# Patient Record
Sex: MALE | Race: WHITE | NOT HISPANIC OR LATINO | URBAN - METROPOLITAN AREA
[De-identification: names, ages, dates, MRNs, and addresses within clinical notes are randomized per-mention and may not be internally consistent; named-entity substitution may affect disease eponyms.]

---

## 2017-05-01 ENCOUNTER — EMERGENCY (EMERGENCY)
Facility: HOSPITAL | Age: 57
LOS: 1 days | Discharge: HOME | End: 2017-05-01
Attending: EMERGENCY MEDICINE | Admitting: EMERGENCY MEDICINE
Payer: COMMERCIAL

## 2017-05-01 VITALS
DIASTOLIC BLOOD PRESSURE: 77 MMHG | TEMPERATURE: 98 F | HEART RATE: 60 BPM | SYSTOLIC BLOOD PRESSURE: 127 MMHG | RESPIRATION RATE: 16 BRPM | OXYGEN SATURATION: 97 %

## 2017-05-01 VITALS
HEIGHT: 69 IN | OXYGEN SATURATION: 95 % | DIASTOLIC BLOOD PRESSURE: 81 MMHG | SYSTOLIC BLOOD PRESSURE: 125 MMHG | TEMPERATURE: 98 F | WEIGHT: 214.07 LBS | HEART RATE: 61 BPM | RESPIRATION RATE: 18 BRPM
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
## 2017-05-01 DIAGNOSIS — S00.83XA CONTUSION OF OTHER PART OF HEAD, INITIAL ENCOUNTER: ICD-10-CM
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
## 2017-05-01 PROCEDURE — 70450 CT HEAD/BRAIN W/O DYE: CPT | Mod: 26

## 2017-05-01 PROCEDURE — 99284 EMERGENCY DEPT VISIT MOD MDM: CPT | Mod: 25

## 2017-05-01 PROCEDURE — 99284 EMERGENCY DEPT VISIT MOD MDM: CPT

## 2017-05-01 PROCEDURE — 90471 IMMUNIZATION ADMIN: CPT

## 2017-05-01 PROCEDURE — 70450 CT HEAD/BRAIN W/O DYE: CPT

## 2017-05-01 PROCEDURE — 90715 TDAP VACCINE 7 YRS/> IM: CPT

## 2017-05-01 RX ORDER — TETANUS TOXOID, REDUCED DIPHTHERIA TOXOID AND ACELLULAR PERTUSSIS VACCINE, ADSORBED 5; 2.5; 8; 8; 2.5 [IU]/.5ML; [IU]/.5ML; UG/.5ML; UG/.5ML; UG/.5ML
0.5 SUSPENSION INTRAMUSCULAR ONCE
Qty: 0 | Refills: 0 | Status: COMPLETED | OUTPATIENT
Start: 2017-05-01 | End: 2017-05-01

## 2017-05-01 RX ADMIN — TETANUS TOXOID, REDUCED DIPHTHERIA TOXOID AND ACELLULAR PERTUSSIS VACCINE, ADSORBED 0.5 MILLILITER(S): 5; 2.5; 8; 8; 2.5 SUSPENSION INTRAMUSCULAR at 13:44

## 2017-05-01 NOTE — ED PROVIDER NOTE - PROGRESS NOTE DETAILS
patient resting comfortably, ct head negative, copy of result given, patient refused pain med, advised follow up with pmd
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
## 2017-05-01 NOTE — ED PROVIDER NOTE - CHPI ED SYMPTOMS NEG
no neck tenderness/no laceration/no dizziness/no loss of consciousness/no difficulty bearing weight/no disorientation
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
## 2019-07-20 NOTE — ED ADULT NURSE NOTE - THOUGHTS OF SUICIDE/SELF-HARM YN, MLM
Universal Safety Interventions
No
pt has dementia, unable to assess
no/pt has dementia, unable to assess

## 2025-07-28 NOTE — ED ADULT NURSE NOTE - BREATH SOUNDS, MLM
The x-ray and ultrasound did not show any foreign bodies in your foot.  The pain you have is most likely from an insect bite or sting.  Please ice the foot and use Tylenol to treat the pain.  You can also use calamine lotion.  If symptoms significantly worsen please return to the emergency department otherwise please follow-up with primary care doctor.  
Clear